# Patient Record
Sex: FEMALE | Race: WHITE | NOT HISPANIC OR LATINO | Employment: UNEMPLOYED | URBAN - METROPOLITAN AREA
[De-identification: names, ages, dates, MRNs, and addresses within clinical notes are randomized per-mention and may not be internally consistent; named-entity substitution may affect disease eponyms.]

---
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
## 2025-06-05 NOTE — PROGRESS NOTES
Daily Note     Today's date: 2018  Patient name: Job Crenshaw  : 2005  MRN: [id number]  Referring provider: Juanjo Leonard MD  Dx:   Encounter Diagnoses   Name Primary?  Tear of left acetabular labrum, initial encounter Yes    Pain in left hip        Start Time: 1730  Stop Time: 1830  Total time in clinic (min): 60 minutes    Subjective: Pt reports of seeing MD for f/u visit  He is please with her progress and would like her to continue PT  Pt denies pain after impact activity last visit    Pain Ratin/10    Objective: See treatment diary below  Precautions; Standard    Specialty Daily Treatment Diary       Manual 12/10/18 12/12/18 12/17/18 12/19/18    PROM L hip all planes L hip all planes L hip all planes L hip all planes    STM IASTM rectus fem/ITB IASTM rectus fem/ITB IASTM rectus fem/ITB IASTM rectus fem/ITB    Joint mobs        Man Flexibility Rectus fem/HS Rectus fem/HS Rectus fem/HS Rectus fem/HS    MET Abd/Add and Flex/ext Abd/Add and Flex/ext Abd/Add and Flex/ext Abd/Add and Flex/ext    Nerve glides Sciatic/femoral Sciatic/femoral  Sciatic/femoral    Exercise Diary         Bridges 20x5" w/band abd/ x10 PB/ x10 alt SLR/ x10 bridge and curl x10 SL/x10 PB/x10 alt SLR x10 SL/x10 PB/x10 alt SLR 2x10 germania SL    Clamshells 30x5" red germania in short side bridge  20x3"/20x3" in short side bridge green 20x3" blue 20x3"red/20x3" red short side bridge    Prone hip ext    SL RDL 2x10 germania 10 lbs    Stationary bike        Step ups Lat step downs 2x10 germania 8in Lat step downs 2x10 germania 8in  2x10 germania 14in    Hip abd 2x15 3lbs 3x10 3lbs 3x10 3lbs 3x15 3lbs    Wall squats Chair with blue band abd 3x10 Chair with blue band abd 2x10 / SL ecc pistols x10 germania Blue band abd x15/SL pistols x10 germania Blue band abd x20/SL pistols x15 germania    Trampoline hops    Alt LE 2x50    [place] hip flex/abd 4 way x15 1 5 ea 4 way x10 1 5 ea 4 way x10 2 0 ea 4 way x10 2 0 ea    Quadruped fire hydrants 20x3" 20x3"  20x3"    [employer] dogs 20x3" 20x3"  20x3" yellow    Hip wall stabilization 10x5" ea + rot  10x5" ea + rot 10x5" ea + rot    Tubing ext 20x5" blue 20x5" blue 20x5" blue     Paloff press 5x5" germania yellow 5x5" germania yellow 5x5" germania yellow     Couch stretch  3x20" 5x20" 5x20"    Line jumps   2x20 fwd/back M/L Box jumps 2x10 8in    Skate hops   2x10 germania slow 2x10 germania slow    Modalities                            Assessment: Pt reports of no pain with box jumps, skater hops or jogging motion on trampoline  Cued to prevent vagus collapse with box jumps  Pt demo good landing form other than slight increase in ant knee translation  Plan: Continue per plan of care  Progress treatment as tolerated  no